# Patient Record
Sex: FEMALE | Race: BLACK OR AFRICAN AMERICAN | NOT HISPANIC OR LATINO | URBAN - METROPOLITAN AREA
[De-identification: names, ages, dates, MRNs, and addresses within clinical notes are randomized per-mention and may not be internally consistent; named-entity substitution may affect disease eponyms.]

---

## 2017-08-20 ENCOUNTER — EMERGENCY (EMERGENCY)
Facility: HOSPITAL | Age: 27
LOS: 1 days | Discharge: HOME | End: 2017-08-20
Attending: EMERGENCY MEDICINE | Admitting: EMERGENCY MEDICINE
Payer: SELF-PAY

## 2017-08-20 VITALS
OXYGEN SATURATION: 100 % | DIASTOLIC BLOOD PRESSURE: 78 MMHG | SYSTOLIC BLOOD PRESSURE: 125 MMHG | TEMPERATURE: 99 F | HEART RATE: 90 BPM | RESPIRATION RATE: 18 BRPM

## 2017-08-20 PROCEDURE — 73610 X-RAY EXAM OF ANKLE: CPT | Mod: 26,LT

## 2017-08-20 PROCEDURE — 73610 X-RAY EXAM OF ANKLE: CPT

## 2017-08-20 PROCEDURE — 73562 X-RAY EXAM OF KNEE 3: CPT

## 2017-08-20 PROCEDURE — 73562 X-RAY EXAM OF KNEE 3: CPT | Mod: 26,LT

## 2017-08-20 PROCEDURE — 99284 EMERGENCY DEPT VISIT MOD MDM: CPT | Mod: 25

## 2017-08-20 PROCEDURE — 99284 EMERGENCY DEPT VISIT MOD MDM: CPT

## 2017-08-20 RX ORDER — ACETAMINOPHEN 500 MG
650 TABLET ORAL ONCE
Qty: 0 | Refills: 0 | Status: COMPLETED | OUTPATIENT
Start: 2017-08-20 | End: 2017-08-20

## 2017-08-20 RX ORDER — CIPROFLOXACIN AND DEXAMETHASONE 3; 1 MG/ML; MG/ML
3 SUSPENSION/ DROPS AURICULAR (OTIC) ONCE
Qty: 0 | Refills: 0 | Status: COMPLETED | OUTPATIENT
Start: 2017-08-20 | End: 2017-08-20

## 2017-08-20 RX ORDER — ALBUTEROL 90 UG/1
0 AEROSOL, METERED ORAL
Qty: 0 | Refills: 0 | COMMUNITY

## 2017-08-20 RX ADMIN — CIPROFLOXACIN AND DEXAMETHASONE 3 DROP(S): 3; 1 SUSPENSION/ DROPS AURICULAR (OTIC) at 21:18

## 2017-08-20 RX ADMIN — Medication 650 MILLIGRAM(S): at 20:21
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
## 2017-08-20 NOTE — ED PROVIDER NOTE - PLAN OF CARE
1. Rest, ice, elevate area.  Take Motrin 600mg every 8 hrs with food for pain.   2. Follow up with PMD within 48-72 hrs.    3. Any worsening pain, swelling, weakness, numbness return to ED. Ortho referral list provided if needed. 1. Rest, ice, elevate area.  Take Motrin 600mg every 8 hrs with food for pain.   2. Apply 3-4 drops of Ciprodex into affected ear 2 times a day x 7 days. Follow up with PMD within 48-72 hrs.    3. Any worsening pain, swelling, weakness, numbness return to ED. Ortho referral list provided if needed.

## 2017-08-20 NOTE — ED PROVIDER NOTE - CARE PLAN
Instructions for follow-up, activity and diet:	1. Rest, ice, elevate area.  Take Motrin 600mg every 8 hrs with food for pain.   2. Follow up with PMD within 48-72 hrs.    3. Any worsening pain, swelling, weakness, numbness return to ED. Ortho referral list provided if needed. Principal Discharge DX:	Knee sprain  Instructions for follow-up, activity and diet:	1. Rest, ice, elevate area.  Take Motrin 600mg every 8 hrs with food for pain.   2. Apply 3-4 drops of Ciprodex into affected ear 2 times a day x 7 days. Follow up with PMD within 48-72 hrs.    3. Any worsening pain, swelling, weakness, numbness return to ED. Ortho referral list provided if needed.  Secondary Diagnosis:	Otitis externa  Secondary Diagnosis:	Ankle sprain

## 2017-08-20 NOTE — ED PROVIDER NOTE - ATTENDING CONTRIBUTION TO CARE
I performed a history and physical exam of the patient and discussed their management with the resident. I reviewed the resident's note and agree with the documented findings and plan of care.  attn - see MDM I performed a history and physical exam of the patient and discussed their management with the resident/ACP. I reviewed the resident/ACP's note and agree with the documented findings and plan of care.    attn - see MDM

## 2017-08-20 NOTE — ED PROVIDER NOTE - PHYSICAL EXAMINATION
Attn - alert, nad, back -, pelvis/hip-, left lower ext -  tender small ecchymosis L patella without deformity, no effusion of knee, FROM knee. Lachman - neg, no MCL or LCL tenderness, achilles-, medial ankle-, tender ATFL, foot - sl swelling lat dorsum of foot, 5th MT-, sensation intact, + distal pulses.
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
## 2017-08-20 NOTE — ED PROVIDER NOTE - LOWER EXTREMITY EXAM, LEFT
+ mild lateral malleolus swelling and ttp. + ttp of medial knee. no knee swelling. slight decreased ROM of knee and ankle 2/2 pain.

## 2019-09-28 ENCOUNTER — RESULT REVIEW (OUTPATIENT)
Age: 29
End: 2019-09-28

## 2020-10-22 ENCOUNTER — RESULT REVIEW (OUTPATIENT)
Age: 30
End: 2020-10-22

## 2021-11-05 PROBLEM — J45.909 UNSPECIFIED ASTHMA, UNCOMPLICATED: Chronic | Status: ACTIVE | Noted: 2017-08-20

## 2021-11-09 PROBLEM — Z00.00 ENCOUNTER FOR PREVENTIVE HEALTH EXAMINATION: Status: ACTIVE | Noted: 2021-11-09

## 2021-11-11 ENCOUNTER — APPOINTMENT (OUTPATIENT)
Dept: ORTHOPEDIC SURGERY | Facility: CLINIC | Age: 31
End: 2021-11-11

## 2024-04-09 ENCOUNTER — EMERGENCY (EMERGENCY)
Facility: HOSPITAL | Age: 34
LOS: 0 days | Discharge: HOME | End: 2024-04-09
Attending: EMERGENCY MEDICINE
Payer: COMMERCIAL

## 2024-04-09 VITALS
SYSTOLIC BLOOD PRESSURE: 119 MMHG | DIASTOLIC BLOOD PRESSURE: 82 MMHG | OXYGEN SATURATION: 100 % | WEIGHT: 160.06 LBS | HEART RATE: 74 BPM | HEIGHT: 61 IN | RESPIRATION RATE: 18 BRPM | TEMPERATURE: 98 F

## 2024-04-09 DIAGNOSIS — H53.149 VISUAL DISCOMFORT, UNSPECIFIED: ICD-10-CM

## 2024-04-09 DIAGNOSIS — H57.13 OCULAR PAIN, BILATERAL: ICD-10-CM

## 2024-04-09 DIAGNOSIS — Z91.040 LATEX ALLERGY STATUS: ICD-10-CM

## 2024-04-09 DIAGNOSIS — H20.9 UNSPECIFIED IRIDOCYCLITIS: ICD-10-CM

## 2024-04-09 DIAGNOSIS — J45.909 UNSPECIFIED ASTHMA, UNCOMPLICATED: ICD-10-CM

## 2024-04-09 PROCEDURE — 99284 EMERGENCY DEPT VISIT MOD MDM: CPT | Mod: 25

## 2024-04-09 NOTE — ED PROVIDER NOTE - CLINICAL SUMMARY MEDICAL DECISION MAKING FREE TEXT BOX
Patient with eye pain and photophobia R>L, severely limited exam, suspect uveitis/iritis, however even after numbing drops could not complete a good exam in ER.  Ophthalmology on call paged to send patient to optho clinic today but she prefers to proceed to [facility] for emergent consult as her symptoms are very uncomfortable.  Patient directed to [facility] by private vehicle, ophthalmology aware.

## 2024-04-09 NOTE — ED PROVIDER NOTE - PATIENT PORTAL LINK FT
You can access the FollowMyHealth Patient Portal offered by [facility] by registering at the following website: [email or web address]. By joining [employer]’s FollowMyHealth portal, you will also be able to view your health information using other applications (apps) compatible with our system.

## 2024-04-09 NOTE — ED ADULT NURSE NOTE - OBJECTIVE STATEMENT
Pt presents to ED c/o BL eye pain/swelling since this morning. Pt states she has Lasix done in January at [facility] and has been having complications ever since. Today, she's been feeling discomfort, she states around dinner time she was suddenly unable to tolerate the light in the room. Today she presents to the ED and she is unable to open both eye with occipital swelling and tearing. Unable to see what eyes look like but pt states they are "blood shot red". safety maintained.

## 2024-04-09 NOTE — ED ADULT TRIAGE NOTE - CHIEF COMPLAINT QUOTE
Patient c/o right eye pain since yesterday. Patient had lasik surgery 2 months ago and states she's had complications since. Patient denies vision change.

## 2024-04-09 NOTE — ED PROVIDER NOTE - OBJECTIVE STATEMENT
Pertinent PMH/PSH/FHx/SHx and Review of Systems contained within:  Patient presents to the ED for BL eye pain, R>L.  Patient states burning pain which started during the night and progressed to severe pain, more in the right eye than the left with severe tearing of her eyes.  She has been having severe photophobia, says that she has stabbing pain particularly in the right with any light.  She is s/p lasik in January at [facility] and has had post surgical complications with severe dry eye, at one point says her conjunctiva tore off due to dryness.  She has since been using prescription drops which she does not recall the name of.  Prior to surgery she had been wearing glasses and contacts to correct her vision.  She denies use of new eye product in recent days, denies any eye trauma, FB sensation, headache, fevers.  LMP current.     Relevant PMHx/SHx/SOCHx/FAMH:  Asthma, Eczema  Patient denies EtOH/tobacco/illicit substance use.    ROS: No fever/chills, No headache, No ear pain/sore throat/dysphagia, No chest pain/palpitations, no SOB/cough/wheeze/stridor, No abdominal pain, No N/V/D/melena, no dysuria/frequency/discharge, No neck/back pain, no rash, no changes in neurological status/function. Pertinent PMH/PSH/FHx/SHx and Review of Systems contained within:  Patient presents to the ED for BL eye pain, R>L.  Patient states burning pain which started during the night and progressed to severe pain, more in the right eye than the left with severe tearing of her eyes.  She has been having severe photophobia, says that she has stabbing pain particularly in the right with any light.  She is s/p lasik in January at [facility] and has had post surgical complications with severe dry eye, at one point says her conjunctiva tore off due to dryness.  She has since been using prescription drops which she does not recall the name of.  Prior to surgery she had been wearing glasses and contacts to correct her vision.  She denies use of new eye product in recent days, denies any eye trauma, FB sensation, headache, fevers, or direct sun exposure (eclipse was yesterday).  LMP current.     Relevant PMHx/SHx/SOCHx/FAMH:  Asthma, Eczema  Patient denies EtOH/tobacco/illicit substance use.    ROS: No fever/chills, No headache, No ear pain/sore throat/dysphagia, No chest pain/palpitations, no SOB/cough/wheeze/stridor, No abdominal pain, No N/V/D/melena, no dysuria/frequency/discharge, No neck/back pain, no rash, no changes in neurological status/function.

## 2024-04-09 NOTE — ED PROVIDER NOTE - PHYSICAL EXAMINATION
Gen: Alert, distressed, well appearing  Head: NC, AT, no periorbital edema, erythema, or reproducible ttp  Eyes:  normal lids, right conjunctive very erythematous and injected, left conjunctiva clear, pain with constriction R>, +BL tearing, eye exam severely limited due to patient inability to tolerated  ENT: normal hearing, patent oropharynx without erythema/exudate, uvula midline  Neck: +supple, +Trachea midline  Pulm: Bilateral BS, normal resp effort, no wheeze/stridor/retractions  CV: RRR, no M/R/G, +dist pulses  Abd: soft, no palpable masses  Mskel: no edema/erythema/cyanosis  Skin: no rash, warm/dry  Neuro: AAOx3, no apparent sensory/motor deficits, coordination intact

## 2024-04-09 NOTE — ED ADULT NURSE NOTE - NSFALLRISKINTERV_ED_ALL_ED

## 2025-09-05 ENCOUNTER — NON-APPOINTMENT (OUTPATIENT)
Age: 35
End: 2025-09-05

## 2025-09-05 ENCOUNTER — APPOINTMENT (OUTPATIENT)
Facility: CLINIC | Age: 35
End: 2025-09-05
Payer: COMMERCIAL

## 2025-09-05 VITALS
OXYGEN SATURATION: 95 % | HEART RATE: 75 BPM | DIASTOLIC BLOOD PRESSURE: 69 MMHG | HEIGHT: 61 IN | SYSTOLIC BLOOD PRESSURE: 109 MMHG | WEIGHT: 165 LBS | BODY MASS INDEX: 31.15 KG/M2

## 2025-09-05 DIAGNOSIS — M62.89 OTHER SPECIFIED DISORDERS OF MUSCLE: ICD-10-CM

## 2025-09-05 DIAGNOSIS — K59.09 OTHER CONSTIPATION: ICD-10-CM

## 2025-09-05 PROCEDURE — 99459 PELVIC EXAMINATION: CPT

## 2025-09-05 PROCEDURE — 99203 OFFICE O/P NEW LOW 30 MIN: CPT
